# Patient Record
Sex: FEMALE | Race: WHITE | NOT HISPANIC OR LATINO | URBAN - METROPOLITAN AREA
[De-identification: names, ages, dates, MRNs, and addresses within clinical notes are randomized per-mention and may not be internally consistent; named-entity substitution may affect disease eponyms.]

---

## 2017-04-24 ENCOUNTER — OUTPATIENT (OUTPATIENT)
Dept: OUTPATIENT SERVICES | Facility: HOSPITAL | Age: 47
LOS: 1 days | End: 2017-04-24

## 2017-04-25 DIAGNOSIS — H02.409 UNSPECIFIED PTOSIS OF UNSPECIFIED EYELID: ICD-10-CM

## 2017-12-08 ENCOUNTER — OUTPATIENT (OUTPATIENT)
Dept: OUTPATIENT SERVICES | Facility: HOSPITAL | Age: 47
LOS: 1 days | Discharge: HOME | End: 2017-12-08

## 2018-05-18 ENCOUNTER — TRANSCRIPTION ENCOUNTER (OUTPATIENT)
Age: 48
End: 2018-05-18

## 2018-07-18 ENCOUNTER — APPOINTMENT (OUTPATIENT)
Dept: INTERNAL MEDICINE | Facility: CLINIC | Age: 48
End: 2018-07-18

## 2018-08-10 ENCOUNTER — APPOINTMENT (OUTPATIENT)
Dept: INTERNAL MEDICINE | Facility: CLINIC | Age: 48
End: 2018-08-10

## 2018-09-10 ENCOUNTER — APPOINTMENT (OUTPATIENT)
Dept: OBGYN | Facility: CLINIC | Age: 48
End: 2018-09-10

## 2019-01-15 ENCOUNTER — TRANSCRIPTION ENCOUNTER (OUTPATIENT)
Age: 49
End: 2019-01-15

## 2020-10-18 ENCOUNTER — EMERGENCY (EMERGENCY)
Facility: HOSPITAL | Age: 50
LOS: 1 days | Discharge: HOME | End: 2020-10-18
Attending: EMERGENCY MEDICINE | Admitting: EMERGENCY MEDICINE
Payer: COMMERCIAL

## 2020-10-18 VITALS
DIASTOLIC BLOOD PRESSURE: 85 MMHG | OXYGEN SATURATION: 99 % | HEIGHT: 62 IN | SYSTOLIC BLOOD PRESSURE: 131 MMHG | HEART RATE: 101 BPM | RESPIRATION RATE: 18 BRPM | TEMPERATURE: 98 F | WEIGHT: 134.92 LBS

## 2020-10-18 VITALS
OXYGEN SATURATION: 97 % | SYSTOLIC BLOOD PRESSURE: 129 MMHG | RESPIRATION RATE: 18 BRPM | DIASTOLIC BLOOD PRESSURE: 76 MMHG | HEART RATE: 89 BPM

## 2020-10-18 DIAGNOSIS — M79.644 PAIN IN RIGHT FINGER(S): ICD-10-CM
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
## 2020-10-18 DIAGNOSIS — S62.635B DISPLACED FRACTURE OF DISTAL PHALANX OF LEFT RING FINGER, INITIAL ENCOUNTER FOR OPEN FRACTURE: ICD-10-CM
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
## 2020-10-18 DIAGNOSIS — S62.632B DISPLACED FRACTURE OF DISTAL PHALANX OF RIGHT MIDDLE FINGER, INITIAL ENCOUNTER FOR OPEN FRACTURE: ICD-10-CM

## 2020-10-18 PROCEDURE — 11760 REPAIR OF NAIL BED: CPT | Mod: LT

## 2020-10-18 PROCEDURE — 26750 TREAT FINGER FRACTURE EACH: CPT | Mod: F3

## 2020-10-18 PROCEDURE — 73120 X-RAY EXAM OF HAND: CPT

## 2020-10-18 PROCEDURE — 99284 EMERGENCY DEPT VISIT MOD MDM: CPT

## 2020-10-18 PROCEDURE — 73120 X-RAY EXAM OF HAND: CPT | Mod: 26,50

## 2020-10-18 PROCEDURE — 99284 EMERGENCY DEPT VISIT MOD MDM: CPT | Mod: 25

## 2020-10-18 RX ORDER — OXYCODONE AND ACETAMINOPHEN 5; 325 MG/1; MG/1
1 TABLET ORAL ONCE
Refills: 0 | Status: DISCONTINUED | OUTPATIENT
Start: 2020-10-18 | End: 2020-10-18

## 2020-10-18 RX ORDER — IBUPROFEN 200 MG
1 TABLET ORAL
Qty: 18 | Refills: 0
Start: 2020-10-18 | End: 2020-10-23

## 2020-10-18 RX ORDER — CEPHALEXIN 500 MG
1 CAPSULE ORAL
Qty: 14 | Refills: 0
Start: 2020-10-18 | End: 2020-10-24

## 2020-10-18 RX ADMIN — OXYCODONE AND ACETAMINOPHEN 1 TABLET(S): 5; 325 TABLET ORAL at 14:50

## 2020-10-18 RX ADMIN — OXYCODONE AND ACETAMINOPHEN 1 TABLET(S): 5; 325 TABLET ORAL at 17:42

## 2020-10-18 RX ADMIN — OXYCODONE AND ACETAMINOPHEN 1 TABLET(S): 5; 325 TABLET ORAL at 15:36

## 2020-10-18 NOTE — ED PROVIDER NOTE - NSFOLLOWUPINSTRUCTIONS_ED_ALL_ED_FT
Keep splints dry and intact on fingers, do not lift anything with affected fingers.  Take full course of antibiotics and tylenol or motrin for pain.  Call to make appointment with hand specialist Dr. Santana this week.  Return to ED for fever, discharge from wound, numbness, weakness, discoloration of skin, vomiting or new injury to area.    You may call our referrals coordinator at [phone] Monday to Friday 11am-7pm for assistance with making an appointment    Laceration    A laceration is a cut that goes through all of the layers of the skin and into the tissue that is right under the skin. Some lacerations heal on their own. Others need to be closed with skin adhesive strips, skin glue, stitches (sutures), or staples. Proper laceration care minimizes the risk of infection and helps the laceration to heal better.  If non-absorbable stitches or staples have been placed, they must be taken out within the time frame instructed by your healthcare provider.    SEEK IMMEDIATE MEDICAL CARE IF YOU HAVE ANY OF THE FOLLOWING SYMPTOMS: swelling around the wound, worsening pain, drainage from the wound, red streaking going away from your wound, inability to move finger or toe near the laceration, or discoloration of skin near the laceration.      Finger Fracture    AMBULATORY CARE:    A finger fracture is a break in one or more of the bones in your finger.    Common signs and symptoms include the following:   •Pain, bruising, or swelling      •Weakness or numbness      •Trouble moving your finger      •Finger shape is not normal      Seek care immediately if:   •Your cast or splint gets wet, damaged, or comes off.      •Your splint or cast feels too tight.      •You have severe pain.      •Your injured finger is numb, cold, or pale.      Call your doctor or hand specialist if:   •Your pain or swelling gets worse, even after treatment.      •You have questions or concerns about your condition or care.      Treatment may include any of the following:   •A cast or splint helps prevent movement and protects your finger so it can heal.      •NSAIDs, such as ibuprofen, help decrease swelling, pain, and fever. This medicine is available with or without a doctor's order. NSAIDs can cause stomach bleeding or kidney problems in certain people. If you take blood thinner medicine, always ask your healthcare provider if NSAIDs are safe for you. Always read the medicine label and follow directions.      •Acetaminophen decreases pain and fever. It is available without a doctor's order. Ask how much to take and how often to take it. Follow directions. Read the labels of all other medicines you are using to see if they also contain acetaminophen, or ask your doctor or pharmacist. Acetaminophen can cause liver damage if not taken correctly. Do not use more than 4 grams (4,000 milligrams) total of acetaminophen in one day.       •Prescription pain medicine may be given. Ask your healthcare provider how to take this medicine safely. Some prescription pain medicines contain acetaminophen. Do not take other medicines that contain acetaminophen without talking to your healthcare provider. Too much acetaminophen may cause liver damage. Prescription pain medicine may cause constipation. Ask your healthcare provider how to prevent or treat constipation.       •Closed reduction is used to put your bone back into the correct position without surgery.      •Open reduction surgery may be needed to put your bone back into the correct position. Wires, pins, plates, or screws may be used to keep the broken pieces lined up correctly.      Self-care:   •Wear your splint as directed. Do not remove your splint until you follow up with your healthcare provider or hand specialist.      •Apply ice on your finger for 15 to 20 minutes every hour or as directed. Use an ice pack, or put crushed ice in a plastic bag. Cover it with a towel before you apply it to your skin. Ice helps prevent tissue damage and decreases swelling and pain.      •Elevate your finger above the level of your heart as often as you can. This will help decrease swelling and pain. Prop your hand on pillows or blankets to keep it elevated comfortably.             •Go to physical therapy as directed. A physical therapist teaches you exercises to help improve movement and strength, and to decrease pain.      Follow up with your doctor or hand specialist within 2 days: Write down your questions so you remember to ask them during your visits.

## 2020-10-18 NOTE — ED ADULT NURSE NOTE - NSIMPLEMENTINTERV_GEN_ALL_ED
Implemented All Universal Safety Interventions:  [place] to call system. Call bell, personal items and telephone within reach. Instruct patient to call for assistance. Room bathroom lighting operational. Non-slip footwear when patient is off stretcher. Physically safe environment: no spills, clutter or unnecessary equipment. Stretcher in lowest position, wheels locked, appropriate side rails in place.

## 2020-10-18 NOTE — ED ADULT TRIAGE NOTE - CHIEF COMPLAINT QUOTE
patient sent from [facility] MD after dropping 25 pund weight on her hands. right 3rd difit and left 4th digit. +deformity. painful with movement and laceration to both fingers. no decreased sensation. pulses intact. tetanus given in urgent care

## 2020-10-18 NOTE — ED ADULT NURSE NOTE - CHIEF COMPLAINT QUOTE
patient sent from [facility] MD after dropping 25 pund weight on her hands. right 3rd difit and left 4th digit. +deformity. painful with movement and laceration to both fingers. no decreased sensation. pulses intact. tetanus given in urgent care

## 2020-10-18 NOTE — ED PROVIDER NOTE - ATTENDING CONTRIBUTION TO CARE
51 y/o healthy F, referred to the ED from urgent care for open fractures to the right 3rd and sixy4vg fingers, after dropping 25lb weights on them at the gym.  on exam.  Ext: swelling and ttp of the right 3rd and 4th distal phalanx. 1 mm laceration to the finger pad of the R 3rd digit, no active bleeding. Swelling w/ deformity to the left 4th digit, 3cm laceration at nailbed, nail loose but intact. FROM of MCP, PIP, DIP of all digits, cap refill <2 seconds, distal sensation intact.  xrays from  uploaded; + comminuted/displaced fx L 4th distal phalanx and ?tuft fx R 3rd digit.  hand consulted and repaired wounds/splinted digits.  pt received tetanus at , dc with motrin/keflex and will f/u this week w/ Dr. Max eid.  NWB splinted digits.  discussed strict return parameters    Agree with above note as documented by PA  Hand repair as per ortho  Pt to follow up as oupt

## 2020-10-18 NOTE — ED PROVIDER NOTE - OBJECTIVE STATEMENT
51 y/o healthy F, referred to the ED from urgent care for open fractures to the right 3rd and szwp1ev fingers, after dropping 25lb weights on them at the gym. Pt went to put down weights and they landed on her right 3rd and 4th, and left 4th digits. Pt reports bleeding noted from the fingers with swelling and pain to the distal aspect of all previously listed fingers, worse with range of motion and to the touch. Denies numbness or weakness. pt went to an urgent care and was referred here for a specialist evaluation. Pt was given tetanus at the urgent care, has not taken anything for pain. Denies fever, chills, dizziness, fainting, or use of anticoagulation.

## 2020-10-18 NOTE — ED PROVIDER NOTE - PHYSICAL EXAMINATION
Vitals reviewed  Gen: well appearing, nad, speaking in full sentences  Skin: wwp, no rash/lesions  HEENT: ncat, eomi, mmm  CV: rrr, no audible m/r/g  Resp: symmetrical expansion, ctab, no w/r/r  Ext: swelling and ttp of the right 3rd and 4th distal phalanx. 1 millimeter laceration to the finger pad of the left 4th digit, no active bleeding. Swelling w/ deformity to the left 4th digit, 3cm laceration at ? nailbed, nail intact. FROM of MCP, PIP, DIP of all digits, cap refill <2 seconds, distal sensation intact. FROM of all other joints w/o any tenderness.  Neuro: alert/oriented, no focal deficits, steady gait Vitals reviewed  Gen: well appearing, nad, speaking in full sentences  Skin: wwp, no rash/lesions  HEENT: ncat, eomi, mmm  CV: rrr, no audible m/r/g  Resp: symmetrical expansion, ctab, no w/r/r  Ext: swelling and ttp of the right 3rd and 4th distal phalanx. 1 mm laceration to the finger pad of the R 3rd digit, no active bleeding. Swelling w/ deformity to the left 4th digit, 3cm laceration at nailbed, nail loose but intact. FROM of MCP, PIP, DIP of all digits, cap refill <2 seconds, distal sensation intact.   FROM of all other joints w/o any tenderness.  Neuro: alert/oriented, no focal deficits, steady gait

## 2020-10-18 NOTE — ED PROVIDER NOTE - CARE PROVIDER_API CALL
Celso Santana  ORTHOPAEDIC SURGERY  [address] [place] [address]  Phone: [phone]  Fax: [phone]  Follow Up Time:

## 2020-10-18 NOTE — ED PROVIDER NOTE - CLINICAL SUMMARY MEDICAL DECISION MAKING FREE TEXT BOX
51 y/o healthy F, referred to the ED from urgent care for open fractures to the right 3rd and eojv9ip fingers, after dropping 25lb weights on them at the gym.  on exam.  Ext: swelling and ttp of the right 3rd and 4th distal phalanx. 1 mm laceration to the finger pad of the R 3rd digit, no active bleeding. Swelling w/ deformity to the left 4th digit, 3cm laceration at nailbed, nail loose but intact. FROM of MCP, PIP, DIP of all digits, cap refill <2 seconds, distal sensation intact.  xrays from  uploaded; + comminuted/displaced fx L 4th distal phalanx and ?tuft fx R 3rd digit.  hand consulted and repaired wounds/splinted digits.  pt received tetanus at , dc with motrin/keflex and will f/u this week w/ Dr. Max eid.  NWB splinted digits.  discussed strict return parameters

## 2020-10-18 NOTE — ED PROVIDER NOTE - PATIENT PORTAL LINK FT
You can access the FollowMyHealth Patient Portal offered by [facility] by registering at the following website: [email or web address]. By joining [facility]’s FollowMyHealth portal, you will also be able to view your health information using other applications (apps) compatible with our system.

## 2020-10-18 NOTE — CONSULT NOTE ADULT - SUBJECTIVE AND OBJECTIVE BOX
Orthopaedic Surgery Consult Note    For Surgeon: Dr. Santana    HPI:  50 F healthy  sent in from urgent care for open fractures to the right 3rd and left 4th fingers, after dropping 25lb weights on them at the gym. Pt went to put down weights and they landed on her right 3rd and 4th, and left 4th digits. Pt reports bleeding noted from the fingers with swelling and pain to the distal aspect of all previously listed fingers, worse with range of motion and to the touch. Denies numbness or weakness. Pt was given tetanus at the urgent care, has not taken anything for pain. Denies fever, chills, dizziness, fainting, or use of anticoagulation.    Allergies    No Known Allergies    Intolerances      PAST MEDICAL & SURGICAL HISTORY:  No pertinent past medical history      MEDICATIONS  (STANDING):    MEDICATIONS  (PRN):      Vital Signs Last 24 Hrs  T(C): 36.8 (18 Oct 2020 14:26), Max: 36.8 (18 Oct 2020 14:26)  T(F): 98.2 (18 Oct 2020 14:26), Max: 98.2 (18 Oct 2020 14:26)  HR: 101 (18 Oct 2020 14:26) (101 - 101)  BP: 131/85 (18 Oct 2020 14:26) (131/85 - 131/85)  BP(mean): --  RR: 18 (18 Oct 2020 14:26) (18 - 18)  SpO2: 99% (18 Oct 2020 14:26) (99% - 99%)    Physical Exam:    General: well appearing in NAD    L hand  laceration on dorsal aspect of digit distal to DIP radial to nailbed  nail 75% off w some bleeding coming through   FDS and FDP intact  cascade of digits off compared to contralateral side  ecchymosis distal to DIP  SILT over entirety of hand   firing all digits, no deficits in strength appreciated   cap refill < 2 seconds     R hand  small 2mm laceration over pulp of 3rd digit w minimal blood.   no gross deformity  ecchymosis present over 4rd digit and bordering digits   cap refill < 2 seconds, radial pulse 2+  SILT over entirety of hand   firing all digits           Imaging:   Fx of L 4th DIP     A/P: 50yFemale w fx of L 4th DIP and poss fx of R 3rd DIP s/p bedside washout, suture re-approximation and nail bed repair on L and washout and suture reprox on R w bilateral splint placement.   - NWB both affected digits, keep splints dry and and intact   - follow up this week w Dr. Santana  - abx as per ED  - pain control as per ED  - pt should return if new discharge, any signs of fevers, or new onset chills or new trauma to affected digits.     -Discussed with Dr. Santana    Ortho Pager [phone]

## 2021-08-23 NOTE — ED PROVIDER NOTE - NSTIMEPROVIDERCAREINITIATE_GEN_ER
Pt last seen for Pre-Op exam on 8/27/21.  Scheduled for Excision, ganglion cyst right foot with Dr. Morrow on 8/27/21.  Dr. Morrow's office is requesting that order for ECX Tumor soft tissue foot toe subc > 1.5 cm.  Okay to enter pended order below?  Please advise.  
Sarika from Dr Morrow's office states patients insurance is requiring a referral in place before patients surgery, on 8/27. [place] [address]. [phone]  
Writer left VM for Dr. Kearney's office with update that requested order #[id number] has been placed.  Writer faxed order to the [facility] @ [phone].  
ok  
18-Oct-2020 14:31

## 2022-03-07 NOTE — ED PROVIDER NOTE - TEMPLATE
Health Maintenance Due   Topic Date Due   • COVID-19 Vaccine (1) Never done   • Influenza Vaccine (1) Never done       Patient is due for topics as listed above but is not proceeding with Immunization(s) COVID-19 and Influenza at this time.    Orthopedic